# Patient Record
Sex: FEMALE | Race: OTHER
[De-identification: names, ages, dates, MRNs, and addresses within clinical notes are randomized per-mention and may not be internally consistent; named-entity substitution may affect disease eponyms.]

---

## 2017-02-07 ENCOUNTER — APPOINTMENT (OUTPATIENT)
Dept: OBGYN | Facility: CLINIC | Age: 27
End: 2017-02-07

## 2019-03-11 ENCOUNTER — APPOINTMENT (OUTPATIENT)
Dept: SURGERY | Facility: CLINIC | Age: 29
End: 2019-03-11
Payer: COMMERCIAL

## 2019-03-11 VITALS
OXYGEN SATURATION: 94 % | TEMPERATURE: 98.4 F | SYSTOLIC BLOOD PRESSURE: 125 MMHG | HEIGHT: 55 IN | DIASTOLIC BLOOD PRESSURE: 77 MMHG | WEIGHT: 125 LBS | HEART RATE: 68 BPM | BODY MASS INDEX: 28.93 KG/M2

## 2019-03-11 PROCEDURE — 99204 OFFICE O/P NEW MOD 45 MIN: CPT

## 2019-03-29 ENCOUNTER — OUTPATIENT (OUTPATIENT)
Dept: OUTPATIENT SERVICES | Facility: HOSPITAL | Age: 29
LOS: 1 days | End: 2019-03-29
Payer: COMMERCIAL

## 2019-03-29 VITALS
WEIGHT: 126.1 LBS | OXYGEN SATURATION: 100 % | HEART RATE: 63 BPM | SYSTOLIC BLOOD PRESSURE: 117 MMHG | RESPIRATION RATE: 16 BRPM | HEIGHT: 59 IN | TEMPERATURE: 99 F | DIASTOLIC BLOOD PRESSURE: 72 MMHG

## 2019-03-29 DIAGNOSIS — K81.9 CHOLECYSTITIS, UNSPECIFIED: ICD-10-CM

## 2019-03-29 DIAGNOSIS — Z01.818 ENCOUNTER FOR OTHER PREPROCEDURAL EXAMINATION: ICD-10-CM

## 2019-03-29 PROCEDURE — 86901 BLOOD TYPING SEROLOGIC RH(D): CPT

## 2019-03-29 PROCEDURE — 86900 BLOOD TYPING SEROLOGIC ABO: CPT

## 2019-03-29 PROCEDURE — G0463: CPT

## 2019-03-29 PROCEDURE — 36415 COLL VENOUS BLD VENIPUNCTURE: CPT

## 2019-03-29 PROCEDURE — 86850 RBC ANTIBODY SCREEN: CPT

## 2019-03-29 NOTE — H&P PST ADULT - ASSESSMENT
27 yo female is scheduled for : Laparoscopic Cholecystectomy with Intra-Operative Cholangiogram  Possible Open, on 4/3/19

## 2019-03-29 NOTE — H&P PST ADULT - NSICDXPROBLEM_GEN_ALL_CORE_FT
PROBLEM DIAGNOSES  Problem: Cholecystitis, unspecified  Assessment and Plan: Laparoscopic Cholecystectomy with Intra-Operative Cholangiogram  Possible Open

## 2019-03-29 NOTE — H&P PST ADULT - NEGATIVE ALLERGY TYPES
no outdoor environmental allergies/no indoor environmental allergies/no reactions to insect bites/no reactions to medicines/no reactions to food/no reactions to animals

## 2019-03-29 NOTE — H&P PST ADULT - HISTORY OF PRESENT ILLNESS
27 yo female with no significant PMHx reports the above. Patient denies pain association with food intake.  Patient states has had other pain, but less severe

## 2019-04-01 PROBLEM — K81.9 CHOLECYSTITIS, UNSPECIFIED: Chronic | Status: ACTIVE | Noted: 2019-03-29

## 2019-04-02 ENCOUNTER — TRANSCRIPTION ENCOUNTER (OUTPATIENT)
Age: 29
End: 2019-04-02

## 2019-04-03 ENCOUNTER — OUTPATIENT (OUTPATIENT)
Dept: OUTPATIENT SERVICES | Facility: HOSPITAL | Age: 29
LOS: 1 days | End: 2019-04-03
Payer: COMMERCIAL

## 2019-04-03 ENCOUNTER — RESULT REVIEW (OUTPATIENT)
Age: 29
End: 2019-04-03

## 2019-04-03 ENCOUNTER — APPOINTMENT (OUTPATIENT)
Dept: SURGERY | Facility: HOSPITAL | Age: 29
End: 2019-04-03

## 2019-04-03 VITALS
HEIGHT: 59 IN | HEART RATE: 63 BPM | OXYGEN SATURATION: 100 % | WEIGHT: 126.1 LBS | DIASTOLIC BLOOD PRESSURE: 70 MMHG | TEMPERATURE: 98 F | SYSTOLIC BLOOD PRESSURE: 106 MMHG | RESPIRATION RATE: 16 BRPM

## 2019-04-03 VITALS
OXYGEN SATURATION: 100 % | TEMPERATURE: 98 F | SYSTOLIC BLOOD PRESSURE: 125 MMHG | DIASTOLIC BLOOD PRESSURE: 73 MMHG | RESPIRATION RATE: 16 BRPM | HEART RATE: 61 BPM

## 2019-04-03 DIAGNOSIS — Z01.818 ENCOUNTER FOR OTHER PREPROCEDURAL EXAMINATION: ICD-10-CM

## 2019-04-03 DIAGNOSIS — K81.9 CHOLECYSTITIS, UNSPECIFIED: ICD-10-CM

## 2019-04-03 LAB — HCG UR QL: NEGATIVE — SIGNIFICANT CHANGE UP

## 2019-04-03 PROCEDURE — 86900 BLOOD TYPING SEROLOGIC ABO: CPT

## 2019-04-03 PROCEDURE — 81025 URINE PREGNANCY TEST: CPT

## 2019-04-03 PROCEDURE — 47563 LAPARO CHOLECYSTECTOMY/GRAPH: CPT | Mod: AS

## 2019-04-03 PROCEDURE — 88304 TISSUE EXAM BY PATHOLOGIST: CPT | Mod: 26

## 2019-04-03 PROCEDURE — 36415 COLL VENOUS BLD VENIPUNCTURE: CPT

## 2019-04-03 PROCEDURE — 47563 LAPARO CHOLECYSTECTOMY/GRAPH: CPT

## 2019-04-03 PROCEDURE — 76000 FLUOROSCOPY <1 HR PHYS/QHP: CPT

## 2019-04-03 PROCEDURE — 86850 RBC ANTIBODY SCREEN: CPT

## 2019-04-03 PROCEDURE — 86901 BLOOD TYPING SEROLOGIC RH(D): CPT

## 2019-04-03 PROCEDURE — 88304 TISSUE EXAM BY PATHOLOGIST: CPT

## 2019-04-03 RX ORDER — ACETAMINOPHEN WITH CODEINE 300MG-30MG
1 TABLET ORAL EVERY 4 HOURS
Qty: 0 | Refills: 0 | Status: DISCONTINUED | OUTPATIENT
Start: 2019-04-03 | End: 2019-04-03

## 2019-04-03 RX ORDER — HYDROMORPHONE HYDROCHLORIDE 2 MG/ML
0.5 INJECTION INTRAMUSCULAR; INTRAVENOUS; SUBCUTANEOUS
Qty: 0 | Refills: 0 | Status: DISCONTINUED | OUTPATIENT
Start: 2019-04-03 | End: 2019-04-03

## 2019-04-03 RX ORDER — SODIUM CHLORIDE 9 MG/ML
3 INJECTION INTRAMUSCULAR; INTRAVENOUS; SUBCUTANEOUS EVERY 8 HOURS
Qty: 0 | Refills: 0 | Status: DISCONTINUED | OUTPATIENT
Start: 2019-04-03 | End: 2019-04-03

## 2019-04-03 RX ORDER — ACETAMINOPHEN WITH CODEINE 300MG-30MG
1 TABLET ORAL
Qty: 6 | Refills: 0 | OUTPATIENT
Start: 2019-04-03

## 2019-04-03 RX ADMIN — Medication 1 TABLET(S): at 15:58

## 2019-04-03 RX ADMIN — Medication 1 TABLET(S): at 16:28

## 2019-04-03 NOTE — ASU DISCHARGE PLAN (ADULT/PEDIATRIC) - CALL YOUR DOCTOR IF YOU HAVE ANY OF THE FOLLOWING:
Fever greater than (need to indicate Fahrenheit or Celsius) Bleeding that does not stop/Fever greater than (need to indicate Fahrenheit or Celsius)

## 2019-04-03 NOTE — ASU DISCHARGE PLAN (ADULT/PEDIATRIC) - CARE PROVIDER_API CALL
Efren Henry)  Surgery  [address], [place] [address]  Phone: [phone]  Fax: [phone]  Follow Up Time:

## 2019-04-03 NOTE — ASU PATIENT PROFILE, ADULT - DOES PATIENT HAVE ADVANCE DIRECTIVE
Number Of Freeze-Thaw Cycles: 1 freeze-thaw cycle Detail Level: Detailed Render Post-Care Instructions In Note?: no Duration Of Freeze Thaw-Cycle (Seconds): 5 No Consent: The patient's consent was obtained including but not limited to risks of crusting, scabbing, blistering, scarring, darker or lighter pigmentary change, recurrence, incomplete removal and infection. Post-Care Instructions: I reviewed with the patient in detail post-care instructions. Patient is to wear sunprotection, and avoid picking at any of the treated lesions. Pt may apply Vaseline to crusted or scabbing areas.

## 2019-04-08 LAB — SURGICAL PATHOLOGY STUDY: SIGNIFICANT CHANGE UP

## 2019-04-11 PROBLEM — Z78.9 SOCIAL ALCOHOL USE: Status: ACTIVE | Noted: 2019-04-11

## 2019-04-11 PROBLEM — K81.9 CHOLECYSTITIS: Status: ACTIVE | Noted: 2019-04-11

## 2019-04-11 PROBLEM — Z78.9 NON-SMOKER: Status: ACTIVE | Noted: 2019-04-11

## 2019-04-18 ENCOUNTER — APPOINTMENT (OUTPATIENT)
Dept: SURGERY | Facility: CLINIC | Age: 29
End: 2019-04-18
Payer: COMMERCIAL

## 2019-04-18 DIAGNOSIS — Z78.9 OTHER SPECIFIED HEALTH STATUS: ICD-10-CM

## 2019-04-18 DIAGNOSIS — K81.9 CHOLECYSTITIS, UNSPECIFIED: ICD-10-CM

## 2019-04-18 PROCEDURE — 99024 POSTOP FOLLOW-UP VISIT: CPT

## 2019-04-18 NOTE — ASSESSMENT
[FreeTextEntry1] : Patient is doing well, with excellent post-operative recovery. All surgical incisions are healing well and as expected. There is no evidence of infection or complication, and patient is progressing as expected. Post-operative wound care, activity, restrictions and precautions reinforced. Patient instructed to refrain from any heavy lifting greater than 10-15 pounds for at least 3-4 weeks post-operatively. Patient's questions and concerns addressed to patient's satisfaction.\par  path discussed

## 2019-04-18 NOTE — PHYSICAL EXAM
[de-identified] : The patient is alert, well-groomed, and cheerful. [Calm] : calm [de-identified] : Surgical wounds are  healing well. No signs of inflammation, infection or exudate.

## 2019-04-18 NOTE — DATA REVIEWED
[FreeTextEntry1] : \par \par \par \par   SEEMA, KRYSTEN                     1\par \par \par \par Surgical Final Report\par \par \par \par \par Final Diagnosis\par \par Gallbladder; laparoscopic cholecystectomy:\par - Chronic calculous cholecystitis.\par \par Verified by: Abby Hurtado MD\par (Electronic Signature)\par Reported on: 04/08/19 13:35 EDT, [address], [place],\[place] [address]\par _________________________________________________________________\par \par Clinical History\par Cholecystitis\par Laparoscopic cholecystectomy with intraoperative cholangiogram\par  \par

## 2019-04-18 NOTE — HISTORY OF PRESENT ILLNESS
[de-identified] : Patient is s/p Laparoscopic cholecystectomy with intraoperative cholangiogram on 04/03/2019. today patient offers no complaints. patient reports no fever, chills,  or  pain.  Surgical wounds are  healing well. No signs of inflammation, infection or exudate. Patient reports good bowel movements and appetite. \par

## 2022-04-05 ENCOUNTER — APPOINTMENT (OUTPATIENT)
Dept: OBGYN | Facility: CLINIC | Age: 32
End: 2022-04-05

## 2022-04-05 PROBLEM — Z00.00 ENCOUNTER FOR PREVENTIVE HEALTH EXAMINATION: Status: ACTIVE | Noted: 2022-04-05

## 2022-05-17 ENCOUNTER — LABORATORY RESULT (OUTPATIENT)
Age: 32
End: 2022-05-17

## 2022-05-18 ENCOUNTER — LABORATORY RESULT (OUTPATIENT)
Age: 32
End: 2022-05-18

## 2022-05-18 ENCOUNTER — APPOINTMENT (OUTPATIENT)
Dept: OBGYN | Facility: CLINIC | Age: 32
End: 2022-05-18
Payer: MEDICAID

## 2022-05-18 VITALS
WEIGHT: 130 LBS | BODY MASS INDEX: 27.29 KG/M2 | HEIGHT: 58 IN | HEART RATE: 86 BPM | DIASTOLIC BLOOD PRESSURE: 73 MMHG | SYSTOLIC BLOOD PRESSURE: 112 MMHG | OXYGEN SATURATION: 99 %

## 2022-05-18 PROCEDURE — 99213 OFFICE O/P EST LOW 20 MIN: CPT | Mod: TH

## 2022-06-08 ENCOUNTER — NON-APPOINTMENT (OUTPATIENT)
Age: 32
End: 2022-06-08

## 2022-06-08 ENCOUNTER — APPOINTMENT (OUTPATIENT)
Dept: ANTEPARTUM | Facility: CLINIC | Age: 32
End: 2022-06-08
Payer: MEDICAID

## 2022-06-08 ENCOUNTER — ASOB RESULT (OUTPATIENT)
Age: 32
End: 2022-06-08

## 2022-06-08 ENCOUNTER — APPOINTMENT (OUTPATIENT)
Dept: OBGYN | Facility: CLINIC | Age: 32
End: 2022-06-08
Payer: MEDICAID

## 2022-06-08 VITALS
SYSTOLIC BLOOD PRESSURE: 106 MMHG | DIASTOLIC BLOOD PRESSURE: 73 MMHG | HEART RATE: 75 BPM | BODY MASS INDEX: 27.59 KG/M2 | OXYGEN SATURATION: 99 % | WEIGHT: 132 LBS

## 2022-06-08 DIAGNOSIS — Z36.9 ENCOUNTER FOR ANTENATAL SCREENING, UNSPECIFIED: ICD-10-CM

## 2022-06-08 PROCEDURE — 99213 OFFICE O/P EST LOW 20 MIN: CPT | Mod: TH

## 2022-06-08 PROCEDURE — 76811 OB US DETAILED SNGL FETUS: CPT

## 2022-06-29 ENCOUNTER — APPOINTMENT (OUTPATIENT)
Dept: OBGYN | Facility: CLINIC | Age: 32
End: 2022-06-29

## 2022-06-29 VITALS
OXYGEN SATURATION: 100 % | DIASTOLIC BLOOD PRESSURE: 70 MMHG | HEART RATE: 76 BPM | BODY MASS INDEX: 28.22 KG/M2 | SYSTOLIC BLOOD PRESSURE: 111 MMHG | WEIGHT: 135 LBS

## 2022-06-29 PROCEDURE — 99213 OFFICE O/P EST LOW 20 MIN: CPT | Mod: TH

## 2022-07-28 ENCOUNTER — APPOINTMENT (OUTPATIENT)
Dept: OBGYN | Facility: CLINIC | Age: 32
End: 2022-07-28

## 2022-08-23 ENCOUNTER — APPOINTMENT (OUTPATIENT)
Dept: ANTEPARTUM | Facility: CLINIC | Age: 32
End: 2022-08-23

## 2024-03-18 NOTE — H&P PST ADULT - MALLAMPATI CLASS
Imaging Studies
Class I (easy) - visualization of the soft palate, fauces, uvula, and both anterior and posterior pillars